# Patient Record
Sex: FEMALE | NOT HISPANIC OR LATINO | URBAN - NONMETROPOLITAN AREA
[De-identification: names, ages, dates, MRNs, and addresses within clinical notes are randomized per-mention and may not be internally consistent; named-entity substitution may affect disease eponyms.]

---

## 2018-08-15 ENCOUNTER — OFFICE VISIT CONVERTED (OUTPATIENT)
Dept: FAMILY MEDICINE CLINIC | Age: 23
End: 2018-08-15
Attending: NURSE PRACTITIONER

## 2021-05-18 NOTE — PROGRESS NOTES
Latasha Hernandez 1995     Office/Outpatient Visit    Visit Date: Wed, Aug 15, 2018 09:01 am    Provider: Cheri Frank N.P. (Assistant: Larisa Anderson MA)    Location: [facility]        Electronically signed by Cheri Frank N.P. on  08/15/2018 12:16:48 PM                             SUBJECTIVE:        CC:     Ms. Hernandez is a 23 year old White female.  presents today due to pain in upper abdomen, started a couple weeks after giving birth, daughter is 7 weeks         HPI:         Epigastric abdominal pain noted.  The location is not described.  There is some radiation to the left upper quadrant and right upper quadrant.  It began 4 weeks ago.  The onset of pain occurred while sleeping.  She characterizes it as throbbing.  Associated symptoms include nausea.  She denies constipation, diarrhea or vomiting.  She has tried TUMS and Zantac     ROS:     CONSTITUTIONAL:  Negative for fever.      CARDIOVASCULAR:  Negative for chest pain, palpitations, tachycardia, orthopnea, and edema.      RESPIRATORY:  Negative for cough, dyspnea, and hemoptysis.      GASTROINTESTINAL:  Negative for acid reflux symptoms.      NEUROLOGICAL:  Negative for dizziness, headaches, paresthesias, and weakness.          [facility]/:     Last Reviewed on 8/15/2018 09:34 AM by Cheri Frank    Past Medical History:             PAST MEDICAL HISTORY             GYNECOLOGICAL HISTORY:        No pregnancy-related problems.    No problems with menstrual cycles.    Current method of contraception is IUD;     Menarche occurred at age 14.    Last Pap was 2014; No abnormal Paps    Has never had a mammogram.          Surgical History:     NONE         Family History:     Father: Healthy     Mother: Healthy         Social History:     Occupation: [employer]     Marital Status: engaged     Children: 2 children     Ms. Hernandez denies any current form of exercise.          Tobacco/Alcohol/Supplements:     Last Reviewed  on 8/15/2018 09:03 AM by Larisa Anderson    Tobacco: She has never smoked.          Alcohol:  Does not drink alcohol and never has.          Substance Abuse History:     Last Reviewed on 8/15/2018 09:34 AM by Cheri Frank    None         Mental Health History:     Last Reviewed on 8/15/2018 09:34 AM by Cheri Frank    NEGATIVE             Immunizations:     DTP  (Diphtheria-Tetanus-whole cell Pertussis) 1995     DTP  (Diphtheria-Tetanus-whole cell Pertussis) 1994     DTP  (Diphtheria-Tetanus-whole cell Pertussis) 3/13/1995     DTP  (Diphtheria-Tetanus-whole cell Pertussis) 6/12/1996     DTP  (Diphtheria-Tetanus-whole cell Pertussis) 6/15/1998     Td adult 9/30/2004     Hib HbOC (4-dose) 1994     Hib HbOC (4-dose) 1994     Hib HbOC (4-dose) 3/13/1995     Hib HbOC (4-dose) 6/12/1996     Hep B (pedi/adol, 3-dose schedule) 1994     Hep B (pedi/adol, 3-dose schedule) 1994     Hep B (pedi/adol, 3-dose schedule) 6/6/1995     OPV  Poliovirus, live (oral) 1994     OPV  Poliovirus, live (oral) 1994     OPV  Poliovirus, live (oral) 3/13/1995     IPOL 6/15/1998     MMR  (Measles-Mumps-Rubella), live 6/12/1996     MMR  (Measles-Mumps-Rubella), live 6/15/1998     Adacel (Tdap) 5/13/2014         Allergies:     Last Reviewed on 8/15/2018 09:03 AM by Larisa Anderson      No Known Drug Allergies.         Current Medications:     Last Reviewed on 8/15/2018 09:03 AM by Larisa Anderson    Mirena 52mg Intrauterine System as directed         OBJECTIVE:        Vitals:         Current: 8/15/2018 9:06:46 AM    Ht:  5 ft, 6.5 in;  Wt: 177.6 lbs;  BMI: 28.2    T: 98.4 F (oral);  BP: 129/82 mm Hg (right arm, sitting);  P: 88 bpm (right arm (BP Cuff), sitting)        Exams:     PHYSICAL EXAM:     GENERAL: vital signs recorded - well developed, well nourished;  no apparent distress;     RESPIRATORY: normal respiratory rate and pattern with no distress; normal breath sounds with no  rales, rhonchi, wheezes or rubs;     CARDIOVASCULAR: normal rate; rhythm is regular;  no systolic murmur;     GASTROINTESTINAL: nontender; normal bowel sounds; no organomegaly;     PSYCHIATRIC:  appropriate affect and demeanor; normal speech pattern; grossly normal memory;         ASSESSMENT           789.06   R10.13  Epigastric abdominal pain              DDx:         ORDERS:         Radiology/Test Orders:       [id number]  Ultrasound Right Upper Quadrant abdomen limited  (Send-Out)           Lab Orders:       [id number]  [facility] - [facility] Amylase, Serum  (Send-Out)         [id number]  [facility] CBC with 3 part diff  (Send-Out)         [id number]  [facility] Comp. Metabolic Panel  (Send-Out)                   PLAN:          Epigastric abdominal pain will get a RUQ ultrasound and check some labs, avoid fried and greasy foods     LABORATORY:  Labs ordered to be performed today include amylase, CBC, and Comprehensive metabolic panel.      RADIOLOGY:  I have ordered Abdominal Ultrasound Limited Right Upper Quadrant to be done today.      RECOMMENDATIONS given include: avoid fried greasy foods.      FOLLOW-UP: after labs/ ultrasound           Orders:       [id number]  [facility] - [facility] Amylase, Serum  (Send-Out)         [id number]  [facility] CBC with 3 part diff  (Send-Out)         [id number]  [facility] Comp. Metabolic Panel  (Send-Out)         [id number]  Ultrasound Right Upper Quadrant abdomen limited  (Send-Out)             Patient Education Handouts:       Cholecystitis and Cholangitis              CHARGE CAPTURE           **Please note: ICD descriptions below are intended for billing purposes only and may not represent clinical diagnoses**        Primary Diagnosis:         789.06 Epigastric abdominal pain            R10.13    Epigastric pain              Orders:          [id number]   Office/outpatient visit; established patient, level 4  (In-House)

## 2021-07-01 VITALS
HEART RATE: 88 BPM | HEIGHT: 67 IN | WEIGHT: 177.6 LBS | DIASTOLIC BLOOD PRESSURE: 82 MMHG | BODY MASS INDEX: 27.88 KG/M2 | SYSTOLIC BLOOD PRESSURE: 129 MMHG | TEMPERATURE: 98.4 F